# Patient Record
Sex: FEMALE
[De-identification: names, ages, dates, MRNs, and addresses within clinical notes are randomized per-mention and may not be internally consistent; named-entity substitution may affect disease eponyms.]

---

## 2019-04-13 ENCOUNTER — HOSPITAL ENCOUNTER (EMERGENCY)
Dept: HOSPITAL 14 - H.ER | Age: 39
Discharge: HOME | End: 2019-04-13
Payer: SELF-PAY

## 2019-04-13 VITALS
HEART RATE: 62 BPM | DIASTOLIC BLOOD PRESSURE: 67 MMHG | TEMPERATURE: 98.9 F | SYSTOLIC BLOOD PRESSURE: 108 MMHG | RESPIRATION RATE: 20 BRPM | OXYGEN SATURATION: 98 %

## 2019-04-13 DIAGNOSIS — O26.91: Primary | ICD-10-CM

## 2019-04-13 LAB
ALBUMIN SERPL-MCNC: 3.8 G/DL (ref 3.5–5)
ALBUMIN/GLOB SERPL: 1.4 {RATIO} (ref 1–2.1)
ALT SERPL-CCNC: 27 U/L (ref 9–52)
AST SERPL-CCNC: 18 U/L (ref 14–36)
BASOPHILS # BLD AUTO: 0.1 K/UL (ref 0–0.2)
BASOPHILS NFR BLD: 0.8 % (ref 0–2)
BUN SERPL-MCNC: 8 MG/DL (ref 7–17)
CALCIUM SERPL-MCNC: 8.9 MG/DL (ref 8.4–10.2)
EOSINOPHIL # BLD AUTO: 0.5 K/UL (ref 0–0.7)
EOSINOPHIL NFR BLD: 5.1 % (ref 0–4)
ERYTHROCYTE [DISTWIDTH] IN BLOOD BY AUTOMATED COUNT: 13.7 % (ref 11.5–14.5)
GFR NON-AFRICAN AMERICAN: > 60
HGB BLD-MCNC: 12.7 G/DL (ref 12–16)
LIPASE SERPL-CCNC: 55 U/L (ref 23–300)
LYMPHOCYTES # BLD AUTO: 2.7 K/UL (ref 1–4.3)
LYMPHOCYTES NFR BLD AUTO: 29.8 % (ref 20–40)
MCH RBC QN AUTO: 30.2 PG (ref 27–31)
MCHC RBC AUTO-ENTMCNC: 34 G/DL (ref 33–37)
MCV RBC AUTO: 88.7 FL (ref 81–99)
MONOCYTES # BLD: 0.4 K/UL (ref 0–0.8)
MONOCYTES NFR BLD: 5 % (ref 0–10)
NEUTROPHILS # BLD: 5.3 K/UL (ref 1.8–7)
NEUTROPHILS NFR BLD AUTO: 59.3 % (ref 50–75)
NRBC BLD AUTO-RTO: 0 % (ref 0–0)
PLATELET # BLD: 257 K/UL (ref 130–400)
PMV BLD AUTO: 9.3 FL (ref 7.2–11.7)
RBC # BLD AUTO: 4.21 MIL/UL (ref 3.8–5.2)
WBC # BLD AUTO: 8.9 K/UL (ref 4.8–10.8)

## 2019-04-13 PROCEDURE — 81025 URINE PREGNANCY TEST: CPT

## 2019-04-13 PROCEDURE — 83690 ASSAY OF LIPASE: CPT

## 2019-04-13 PROCEDURE — 76817 TRANSVAGINAL US OBSTETRIC: CPT

## 2019-04-13 PROCEDURE — 85025 COMPLETE CBC W/AUTO DIFF WBC: CPT

## 2019-04-13 PROCEDURE — 80053 COMPREHEN METABOLIC PANEL: CPT

## 2019-04-13 PROCEDURE — 76705 ECHO EXAM OF ABDOMEN: CPT

## 2019-04-13 PROCEDURE — 99284 EMERGENCY DEPT VISIT MOD MDM: CPT

## 2019-04-13 PROCEDURE — 84702 CHORIONIC GONADOTROPIN TEST: CPT

## 2019-04-13 NOTE — US
Date of service: 



04/13/2019



HISTORY:

RUQ pain



COMPARISON:

None.



TECHNIQUE:

Sonographic evaluation of the right upper quadrant of the abdomen.



FINDINGS:



LIVER:

Measures 18 cm in length. Normal echogenicity of the liver 

parenchyma.  No mass. No intrahepatic bile duct dilatation.  Main 

portal vein and hepatic veins demonstrate normal directional flow. 



GALLBLADDER:

Contracted.  Echogenic avascular structure in the gallbladder fundus, 

difficult to evaluate, possibly polyp 



COMMON BILE DUCT:

Measures 3 mm.  No stones. No dilatation.



PANCREAS:

Unremarkable as visualized. No mass. No ductal dilatation.



RIGHT KIDNEY:

Measures 10.5 x 5.1 x 4.3 cm in length. Normal echogenicity. No 

calculus, mass, or hydronephrosis.



AORTA:

No aneurysmal dilatation.



IVC:

Unremarkable.



OTHER FINDINGS:

None .



IMPRESSION:

Mild hepatomegaly.



Contracted gallbladder with small echogenic avascular nonshadowing 

structure, possibly representing a polyp or tumefactive sludge.  

Follow-up is advised.

## 2019-04-13 NOTE — ED PDOC
HPI: Abdomen


Time Seen by Provider: 19 15:04


Chief Complaint (Nursing): Abdominal Pain


Chief Complaint (Provider): Abdominal Pain


History Per: Patient


History/Exam Limitations: no limitations


Onset/Duration Of Symptoms: Days


Current Symptoms Are (Timing): Still Present


Location Of Pain/Discomfort: RUQ


Additional Complaint(s): 


37 y/o female who is  at 7 weeks pregnant presents to the ED for evaluation 

of RUQ abdominal pain, onset two months. Patient hadn't yet confirmed that she 

was pregnant until her arrival to the ED. Patient reports abdominal pain has 

been worsening since onset and is associated with nausea and breast tenderness. 

Patient notes pain worsened over the last few days thus prompting today's visit.

Patient has a history of Laproscopic surgery to relieve obstruction of the left 

fallopian tube. Patient states she does not have a functioning right fallopian 

tube. Patient notes her periods are irregular. Otherwise, patient denies 

vomiting, vaginal discharge, vaginal bleeding, diarrhea and constipation. 

Patient additionally has a history of seizure disorder for which she takes 

carbamazepine. Patient notes of taking carbamazepine through previous pregnancy.










PMD: [facility] in [place], [place].





Abnormal Vaginal Bleeding: No


: 2


Para: 1





Past Medical History


Reviewed: Historical Data, Nursing Documentation, Vital Signs


Vital Signs: 





                                Last Vital Signs











Temp  98.9 F   19 14:42


 


Pulse  62   19 14:42


 


Resp  20   19 14:42


 


BP  108/67   19 14:42


 


Pulse Ox  98   19 14:42














- Medical History


PMH: Seizures





- Surgical History


Other surgeries: Laproscopic surgery to relieve obstruction of the left 

fallopian tube





- Family History


Family History: States: No Known Family Hx





- Social History


Current smoker - smoking cessation education provided: No


Alcohol: None


Drugs: Denies





- Immunization History


Hx Tetanus Toxoid Vaccination: No


Hx Influenza Vaccination: No


Hx Pneumococcal Vaccination: No





- Home Medications


Home Medications: 


                                Ambulatory Orders











 Medication  Instructions  Recorded


 


Iron 1 tab PO DAILY 14


 


Metronidazole 500 mg PO BID 14


 


Famotidine [Pepcid] 40 mg PO DAILY PRN #14 tab 19


 


Folic Acid 0.4 mg PO DAILY #30 tablet 19


 


Prenatal Multivit/Folic Acid/I 1 tab PO DAILY #100 tab 19





[Prenatal Plus]  














- Allergies


Allergies/Adverse Reactions: 


                                    Allergies











Allergy/AdvReac Type Severity Reaction Status Date / Time


 


No Known Allergies Allergy   Verified 19 14:41














Review of Systems


ROS Statement: Except As Marked, All Systems Reviewed And Found Negative


Cardiovascular: Positive for: Other (breast tenderness)


Gastrointestinal: Positive for: Nausea, Abdominal Pain.  Negative for: Vomiting,

Diarrhea, Constipation


Genitourinary Female: Negative for: Vaginal Discharge, Vaginal Bleeding





Physical Exam





- Reviewed


Nursing Documentation Reviewed: Yes


Vital Signs Reviewed: Yes





- Physical Exam


Appears: Positive for: Non-toxic, No Acute Distress


Head Exam: Positive for: ATRAUMATIC, NORMOCEPHALIC


Skin: Positive for: Warm, Dry


Eye Exam: Positive for: EOMI, PERRL


ENT: Negative for: Pharyngeal Erythema, Tonsillar Exudate


Neck: Positive for: Painless ROM, Supple


Cardiovascular/Chest: Positive for: Regular Rate, Rhythm.  Negative for: Murmur


Respiratory: Positive for: Normal Breath Sounds.  Negative for: Respiratory 

Distress


Gastrointestinal/Abdominal: Positive for: Soft, Tenderness (Tenderness to deep 

palpation of the RUQ. Mild Pelvic tenderness to palpation).  Negative for: Other

(McBurney's Point Tenderness. Sae's Sign)


Back: Positive for: Normal Inspection.  Negative for: Decreased ROM


Extremity: Positive for: Normal ROM.  Negative for: Deformity


Lymphatic: Negative for: Adenopathy


Neurological/Psych: Positive for: Awake, Alert.  Negative for: Motor/Sensory 

Deficits





- Laboratory Results


Result Diagrams: 


                                 19 15:15





                                 19 15:15





- ECG


O2 Sat by Pulse Oximetry: 98 (RA)


Pulse Ox Interpretation: Normal





Medical Decision Making


Medical Decision Making: 


Time: 1525


Impression:  Abdominal pain in early pregnancy


Differentials include but not limited to ectopic pregnancy, round ligament pain,

adhesion pain, cholelithiasis, hepatitis and pancreatitis. 


Plan:


-- Beta-HCG-Quantitative


-- CMP


-- Liipase


-- CBC with Differentials 


-- Lactated Ringer's IV 1000 mls/hr


-- IV Insertion


-- US Abdomen Limited (GB Included) 


-- US OB Transvaginal Pregnancy





Time: 


ABDOMEN US


Date of service: 





2019





HISTORY:


RUQ pain





COMPARISON:


None.





TECHNIQUE:


Sonographic evaluation of the right upper quadrant of the abdomen.





FINDINGS:





LIVER:


Measures 18 cm in length. Normal echogenicity of the liver parenchyma.  No mass.

No intrahepatic bile duct dilatation.  Main portal vein and hepatic veins 

demonstrate normal directional flow. 





GALLBLADDER:


Contracted.  Echogenic avascular structure in the gallbladder fundus, difficult 

to evaluate, possibly polyp 





COMMON BILE DUCT:


Measures 3 mm.  No stones. No dilatation.





PANCREAS:


Unremarkable as visualized. No mass. No ductal dilatation.





RIGHT KIDNEY:


Measures 10.5 x 5.1 x 4.3 cm in length. Normal echogenicity. No calculus, mass, 

or hydronephrosis.





AORTA:


No aneurysmal dilatation.





IVC:


Unremarkable.





OTHER FINDINGS:


None .





IMPRESSION:


Mild hepatomegaly.





Contracted gallbladder with small echogenic avascular nonshadowing structure, 

possibly representing a polyp or tumefactive sludge.  Follow-up is advised.








Time: 


OB US RESULTS


Date of service: 





2019





PROCEDURE:  OB Pelvic Ultrasound





HISTORY:


pain early pregnancy r/o ectopic





LMP: 2019





COMPARISON:


None available.





FINDINGS:





UTERUS:


Gestational sac: Single intrauterine gestation.  Mean sac diameter measures 1.7 

cm compatible with estimated gestational age of 6 weeks, 0 days 





Yolk sac: Measures 0.2 cm 





Fetal pole: Crown-rump length measures 0.6 cm compatible with estimated 

gestational age of 6 weeks, 3 days 





Fetal Heart rate: 120 bpm.





Fetal age (Ultrasound estimated): 6 weeks, 2 days





Arlen-gestational hemorrhage: None.





Date of delivery (Ultrasound estimated) : 2019





Uterus measures 8.8 x 5.8 x 7.1 cm. Normal in size and appearance. 





CERVIX:


Measures 3.4 cm. Long and closed. No cervical abnormality seen.





RIGHT OVARY:


Measures 2.8 x 2.1 x 2.0 cm. No mass lesion. Normal flow. 





LEFT OVARY:


Measures 3.6 x 2.2 x 1.8 cm.  Corpus luteum measures 1.8 x 2.0 x 1.9 cm no solid

mass. Normal flow. 





FREE FLUID:


None.





OTHER FINDINGS:


None. 





IMPRESSION:


Single live intrauterine gestation with average ultrasound age of 6 weeks, 2 

days.  Fetal heart rate 120 beats per minute.  Cervix long and closed.





----------------------------





DW pt findings and plan of care. Requests prescription of folic acid in addition

to prenatal vitamin as she was told by previous OB/GYN that she requires extra 

folic acid (due to carbamazepine?)





_______________________________

_______________________________________________________________________


Scribe Attestation:


Documented by Tashi Trotter, acting as a scribe Madai Stout MD.





Provider Scribe Attestation:


All medical record entries made by the Scribe were at my direction and 

personally dictated by me. I have reviewed the chart and agree that the record 

accurately reflects my personal performance of the history, physical exam, 

medical decision making, and the department course for this patient. I have also

personally directed, reviewed, and agree with the discharge instructions and 

disposition.





Disposition





- Clinical Impression


Clinical Impression: 


 Abdominal pain during pregnancy





Counseled Patient/Family Regarding: Studies Performed, Diagnosis, Need For 

Followup, Rx Given





- Disposition


[facility]errals: 


[facility] ([facility] 

[facility] A [facility])


Disposition: Routine/Home


Disposition Time: 18:13


Condition: STABLE


Prescriptions: 


Famotidine [Pepcid] 40 mg PO DAILY PRN #14 tab


 PRN Reason: reflux


Folic Acid 0.4 mg PO DAILY #30 tablet


Prenatal Multivit/Folic Acid/I [Prenatal Plus] 1 tab PO DAILY #100 tab


Instructions:  Stomach Pain in Early Pregnancy


Print Language: Turkish

## 2019-04-13 NOTE — US
Date of service: 



04/13/2019



PROCEDURE:  OB Pelvic Ultrasound



HISTORY:

pain early pregnancy r/o ectopic



LMP: 02/22/2019



COMPARISON:

None available.



FINDINGS:



UTERUS:

Gestational sac: Single intrauterine gestation.  Mean sac diameter 

measures 1.7 cm compatible with estimated gestational age of 6 weeks, 

0 days 



Yolk sac: Measures 0.2 cm 



Fetal pole: Crown-rump length measures 0.6 cm compatible with 

estimated gestational age of 6 weeks, 3 days 



Fetal Heart rate: 120 bpm.



Fetal age (Ultrasound estimated): 6 weeks, 2 days



Arlen-gestational hemorrhage: None.



Date of delivery (Ultrasound estimated) : 12/05/2019



Uterus measures 8.8 x 5.8 x 7.1 cm. Normal in size and appearance. 



CERVIX:

Measures 3.4 cm. Long and closed. No cervical abnormality seen.



RIGHT OVARY:

Measures 2.8 x 2.1 x 2.0 cm. No mass lesion. Normal flow. 



LEFT OVARY:

Measures 3.6 x 2.2 x 1.8 cm.  Corpus luteum measures 1.8 x 2.0 x 1.9 

cm no solid mass. Normal flow. 



FREE FLUID:

None.



OTHER FINDINGS:

None. 



IMPRESSION:

Single live intrauterine gestation with average ultrasound age of 6 

weeks, 2 days.  Fetal heart rate 120 beats per minute.  Cervix long 

and closed.